# Patient Record
Sex: FEMALE | Race: WHITE | NOT HISPANIC OR LATINO | Employment: UNEMPLOYED | URBAN - NONMETROPOLITAN AREA
[De-identification: names, ages, dates, MRNs, and addresses within clinical notes are randomized per-mention and may not be internally consistent; named-entity substitution may affect disease eponyms.]

---

## 2020-08-25 ENCOUNTER — OFFICE VISIT (OUTPATIENT)
Dept: CARDIOLOGY | Facility: CLINIC | Age: 50
End: 2020-08-25

## 2020-08-25 VITALS
HEART RATE: 88 BPM | WEIGHT: 205 LBS | HEIGHT: 62 IN | DIASTOLIC BLOOD PRESSURE: 86 MMHG | BODY MASS INDEX: 37.73 KG/M2 | TEMPERATURE: 98.2 F | SYSTOLIC BLOOD PRESSURE: 138 MMHG

## 2020-08-25 DIAGNOSIS — G47.30 SLEEP APNEA IN ADULT: ICD-10-CM

## 2020-08-25 DIAGNOSIS — R06.02 SHORTNESS OF BREATH: ICD-10-CM

## 2020-08-25 DIAGNOSIS — E78.1 HYPERTRIGLYCERIDEMIA: ICD-10-CM

## 2020-08-25 DIAGNOSIS — E88.81 METABOLIC SYNDROME: ICD-10-CM

## 2020-08-25 DIAGNOSIS — I10 ESSENTIAL HYPERTENSION: ICD-10-CM

## 2020-08-25 DIAGNOSIS — R00.2 PALPITATIONS: ICD-10-CM

## 2020-08-25 DIAGNOSIS — R42 DIZZINESS: ICD-10-CM

## 2020-08-25 DIAGNOSIS — R07.2 PRECORDIAL PAIN: Primary | ICD-10-CM

## 2020-08-25 PROCEDURE — 93000 ELECTROCARDIOGRAM COMPLETE: CPT | Performed by: INTERNAL MEDICINE

## 2020-08-25 PROCEDURE — 99204 OFFICE O/P NEW MOD 45 MIN: CPT | Performed by: INTERNAL MEDICINE

## 2020-08-25 RX ORDER — DICYCLOMINE HYDROCHLORIDE 10 MG/1
10 CAPSULE ORAL 2 TIMES DAILY
Qty: 60 CAPSULE | Refills: 4 | Status: SHIPPED | OUTPATIENT
Start: 2020-08-25

## 2020-08-25 RX ORDER — HYDROXYCHLOROQUINE SULFATE 200 MG/1
200 TABLET, FILM COATED ORAL 2 TIMES DAILY
COMMUNITY

## 2020-08-25 RX ORDER — DULOXETIN HYDROCHLORIDE 60 MG/1
60 CAPSULE, DELAYED RELEASE ORAL DAILY
COMMUNITY

## 2020-08-25 RX ORDER — CYCLOBENZAPRINE HCL 5 MG
5 TABLET ORAL AS NEEDED
COMMUNITY

## 2020-08-25 RX ORDER — AMLODIPINE BESYLATE 5 MG/1
5 TABLET ORAL DAILY
COMMUNITY

## 2020-08-25 RX ORDER — OMEPRAZOLE 20 MG/1
20 CAPSULE, DELAYED RELEASE ORAL 2 TIMES DAILY
COMMUNITY

## 2020-08-25 RX ORDER — CETIRIZINE HYDROCHLORIDE 10 MG/1
10 TABLET ORAL DAILY
COMMUNITY

## 2020-08-25 RX ORDER — FENOFIBRATE 145 MG/1
145 TABLET, COATED ORAL DAILY
COMMUNITY

## 2020-08-25 NOTE — PATIENT INSTRUCTIONS
Mediterranean Diet  A Mediterranean diet refers to food and lifestyle choices that are based on the traditions of countries located on the [place]. This way of eating has been shown to help prevent certain conditions and improve outcomes for people who have chronic diseases, like kidney disease and heart disease.  What are tips for following this plan?  Lifestyle  · Cook and eat meals together with your family, when possible.  · Drink enough fluid to keep your urine clear or pale yellow.  · Be physically active every day. This includes:  ? Aerobic exercise like running or swimming.  ? Leisure activities like gardening, walking, or housework.  · Get 7-8 hours of sleep each night.  · If recommended by your health care provider, drink red wine in moderation. This means 1 glass a day for nonpregnant women and 2 glasses a day for men. A glass of wine equals 5 oz (150 mL).  Reading food labels    · Check the serving size of packaged foods. For foods such as rice and pasta, the serving size refers to the amount of cooked product, not dry.  · Check the total fat in packaged foods. Avoid foods that have saturated fat or trans fats.  · Check the ingredients list for added sugars, such as corn syrup.  Shopping  · At the grocery store, buy most of your food from the areas near the walls of the store. This includes:  ? Fresh fruits and vegetables (produce).  ? Grains, beans, nuts, and seeds. Some of these may be available in unpackaged forms or large amounts (in bulk).  ? Fresh seafood.  ? Poultry and eggs.  ? Low-fat dairy products.  · Buy whole ingredients instead of prepackaged foods.  · Buy fresh fruits and vegetables in-season from local farmers markets.  · Buy frozen fruits and vegetables in resealable bags.  · If you do not have access to quality fresh seafood, buy precooked frozen shrimp or canned fish, such as tuna, salmon, or sardines.  · Buy small amounts of raw or cooked vegetables, salads, or olives from  the deli or salad bar at your store.  · Stock your pantry so you always have certain foods on hand, such as olive oil, canned tuna, canned tomatoes, rice, pasta, and beans.  Cooking  · Cook foods with extra-virgin olive oil instead of using butter or other vegetable oils.  · Have meat as a side dish, and have vegetables or grains as your main dish. This means having meat in small portions or adding small amounts of meat to foods like pasta or stew.  · Use beans or vegetables instead of meat in common dishes like chili or lasagna.  · [place] with different cooking methods. Try roasting or broiling vegetables instead of steaming or sautéeing them.  · Add frozen vegetables to soups, stews, pasta, or rice.  · Add nuts or seeds for added healthy fat at each meal. You can add these to yogurt, salads, or vegetable dishes.  · Marinate fish or vegetables using olive oil, lemon juice, garlic, and fresh herbs.  Meal planning    · Plan to eat 1 vegetarian meal one day each week. Try to work up to 2 vegetarian meals, if possible.  · Eat seafood 2 or more times a week.  · Have healthy snacks readily available, such as:  ? Vegetable sticks with hummus.  ? Greek yogurt.  ? Fruit and nut trail mix.  · Eat balanced meals throughout the week. This includes:  ? Fruit: 2-3 servings a day  ? Vegetables: 4-5 servings a day  ? Low-fat dairy: 2 servings a day  ? Fish, poultry, or lean meat: 1 serving a day  ? Beans and legumes: 2 or more servings a week  ? Nuts and seeds: 1-2 servings a day  ? Whole grains: 6-8 servings a day  ? Extra-virgin olive oil: 3-4 servings a day  · Limit red meat and sweets to only a few servings a month  What are my food choices?  · Mediterranean diet  ? Recommended  § Grains: Whole-grain pasta. Brown rice. Bulgar wheat. Polenta. Couscous. Whole-wheat bread. Oatmeal. Quinoa.  § Vegetables: Artichokes. Beets. Broccoli. Cabbage. Carrots. Eggplant. Green beans. Chard. Kale. Spinach. Onions. Leeks. Peas. Squash.  Tomatoes. Peppers. Radishes.  § Fruits: Apples. Apricots. Avocado. Berries. Bananas. Cherries. Dates. Figs. Grapes. Norma. Melon. Oranges. Peaches. Plums. Pomegranate.  § Meats and other protein foods: Beans. Almonds. Sunflower seeds. Pine nuts. Peanuts. Cod. [place]. Scallops. Shrimp. Tuna. Tilapia. Clams. Oysters. Eggs.  § Dairy: Low-fat milk. Cheese. Greek yogurt.  § Beverages: Water. Red wine. Herbal tea.  § Fats and oils: Extra virgin olive oil. Avocado oil. Grape seed oil.  § Sweets and desserts: Greek yogurt with honey. Baked apples. Poached pears. Trail mix.  § Seasoning and other foods: Basil. Cilantro. Coriander. Cumin. Mint. Parsley. [place]. Rosemary. Tarragon. Garlic. Oregano. Thyme. Pepper. Balsalmic vinegar. Tahini. Hummus. Tomato sauce. Olives. Mushrooms.  ? Limit these  § Grains: Prepackaged pasta or rice dishes. Prepackaged cereal with added sugar.  § Vegetables: Deep fried potatoes (french fries).  § Fruits: Fruit canned in syrup.  § Meats and other protein foods: Beef. Pork. Lamb. Poultry with skin. Hot dogs. Chaney.  § Dairy: Ice cream. Sour cream. Whole milk.  § Beverages: Juice. Sugar-sweetened soft drinks. Beer. Liquor and spirits.  § Fats and oils: Butter. Canola oil. Vegetable oil. Beef fat (tallow). Lard.  § Sweets and desserts: Cookies. Cakes. Pies. Candy.  § Seasoning and other foods: Mayonnaise. Premade sauces and marinades.  The items listed may not be a complete list. Talk with your dietitian about what dietary choices are right for you.  Summary  · The Mediterranean diet includes both food and lifestyle choices.  · Eat a variety of fresh fruits and vegetables, beans, nuts, seeds, and whole grains.  · Limit the amount of red meat and sweets that you eat.  · Talk with your health care provider about whether it is safe for you to drink red wine in moderation. This means 1 glass a day for nonpregnant women and 2 glasses a day for men. A glass of wine equals 5 oz (150 mL).  This information  is not intended to replace advice given to you by your health care provider. Make sure you discuss any questions you have with your health care provider.  Document Released: 08/10/2017 Document Revised: 08/17/2017 Document Reviewed: 08/10/2017  Elsevier Patient Education © 2020 [employer]

## 2020-08-25 NOTE — PROGRESS NOTES
Chief Complaint   Patient presents with   • Establish Care     Referred for cardiac evaluation. Patient states that she has been having chest pain. States that she thinks that she saw you in the past between 2002 and 2006, states that she wore holter monitor, stress test, and echo. Last lab work was done a couple of days ago per PCP, not in chart, will attempt to obtain. Had EKG done in May before knee surgery.    • Aspirin     Patient is on aspirin.    • Chest Pain     States that chest pain that feels like a stabbing pain for about 4 or 5 months. Had pain in the middle of chest and started vomitting on Sunday and hands were numb and tingly on Sunday. States a few weeks ago she had a pain in her right arm under her bicep. States that fingers stay numb a lot.    • Palpitations     States that she occasionally has palpitations, states that they are not often.    • Dizziness     States that she has dizziness a lot. If she bends over or if she stands up too quickly.    • Sleep Apnea     States that ENT told her that she stopped breathing about 16 times in the night and she was to wear machine at night, but she did not .         CARDIAC COMPLAINTS  chest pressure/discomfort, Dizziness, fatigue and palpitations      Subjective   Suzy Reich is a 50 y.o. female came in today for her initial cardiac evaluation.  She has history of hypertension, hypertriglyceridemia who also has history of rheumatoid arthritis.  She has been having chest pain on and off for the last few months.  Is stabbing pain in the middle part of the chest which is been going on for the last few months.  Recently it is been occurring more frequently and more intensely.  Few days ago she had an episode where she had significant discomfort associated with nausea and vomiting.  During that time she also noted that both her hands became numb and tingling sensation.  She had a similar problem a few weeks ago but at that time she had symptoms mostly  in the right arm.  She also has been having palpitation in the form of heart skipping and racing.  It used to be more often in the past but recently it is getting little better.  She also has episodes of dizziness which occurs more frequently.  The symptoms gets worse when she bends over or if she stands up too suddenly.  She also has been told that she has sleep apnea.  She was seen by ENT doctor in the past who apparently told her that she stopped breathing at least 16 times.  She was advised to wear a CPAP but she has not picked it up.  She has history of smoking which she quit in 97.  She smoked only for 2 years.  She drinks occasional alcohol and she has used marijuana in the past.  She does have family history of hypertension hypercholesterolemia.  Her mother as well as grandmother both had MI at young age.    History reviewed. No pertinent surgical history.    Current Outpatient Medications   Medication Sig Dispense Refill   • amLODIPine (NORVASC) 5 MG tablet Take 5 mg by mouth Daily.     • cetirizine (zyrTEC) 10 MG tablet Take 10 mg by mouth Daily.     • cyclobenzaprine (FLEXERIL) 5 MG tablet Take 5 mg by mouth As Needed for Muscle Spasms.     • DULoxetine (CYMBALTA) 60 MG capsule Take 60 mg by mouth Daily.     • fenofibrate (TRICOR) 145 MG tablet Take 145 mg by mouth Daily.     • hydroxychloroquine (PLAQUENIL) 200 MG tablet Take 200 mg by mouth 2 (Two) Times a Day.     • inFLIXimab-dyyb (INFLECTRA IV) Every 6 weeks     • omeprazole (priLOSEC) 20 MG capsule Take 20 mg by mouth 2 (two) times a day.     • dicyclomine (BENTYL) 10 MG capsule Take 1 capsule by mouth 2 (Two) Times a Day. 60 capsule 4     No current facility-administered medications for this visit.            ALLERGIES:  Vancomycin; Lortab [hydrocodone-acetaminophen]; and Morphine    Past Medical History:   Diagnosis Date   • Anxiety    • CKD (chronic kidney disease) stage 3, GFR 30-59 ml/min (CMS/[facility])    • Depression    • Fibromyalgia    • GERD  (gastroesophageal reflux disease)    • History of  section     X2   • History of cholecystectomy    • History of knee surgery    • History of knee surgery    • History of tubal ligation    • Hyperlipidemia    • Hypertension    • Osteoarthritis    • Rheumatoid arthritis (CMS/HCC)    • Sleep apnea     States that she was told to wear a machine at night, but was never able to .       Social History     Tobacco Use   Smoking Status Former Smoker   • Packs/day: 0.25   • Years: 2.00   • Pack years: 0.50   • Types: Cigarettes   • Last attempt to quit:    • Years since quittin.6   Smokeless Tobacco Never Used          Family History   Problem Relation Age of Onset   • Heart attack Mother    • Heart disease Mother    • Hypertension Mother    • Hyperlipidemia Mother    • Hypertension Father    • Hyperlipidemia Father    • Hypertension Sister    • Hyperlipidemia Sister    • Heart attack Maternal Grandmother    • Heart disease Maternal Grandmother        Review of Systems   Constitution: Positive for malaise/fatigue. Negative for decreased appetite.   HENT: Negative for congestion and sore throat.    Eyes: Negative for blurred vision.   Cardiovascular: Positive for chest pain and palpitations.   Respiratory: Positive for snoring. Negative for shortness of breath.    Endocrine: Negative for cold intolerance and heat intolerance.   Hematologic/Lymphatic: Negative for adenopathy. Does not bruise/bleed easily.   Skin: Negative for itching, nail changes and skin cancer.   Musculoskeletal: Negative for arthritis and myalgias.   Gastrointestinal: Negative for abdominal pain, dysphagia and heartburn.   Genitourinary: Negative for bladder incontinence and frequency.   Neurological: Positive for dizziness and light-headedness. Negative for seizures and vertigo.   Psychiatric/Behavioral: Negative for altered mental status.   Allergic/Immunologic: Negative for environmental allergies and hives.       Diabetes-  "No  Thyroid- normal    Objective     /86 (BP Location: Right arm)   Pulse 88   Temp 98.2 °F (36.8 °C)   Ht 157.5 cm (62\")   Wt 93 kg (205 lb)   BMI 37.49 kg/m²     Physical Exam   Constitutional: She is oriented to person, place, and time. She appears well-developed and well-nourished.   HENT:   Head: Normocephalic.   Nose: Nose normal.   Eyes: Pupils are equal, round, and reactive to light. EOM are normal.   Neck: Normal range of motion. Neck supple.   Cardiovascular: Normal rate, regular rhythm, S1 normal and S2 normal.   Murmur heard.  Pulmonary/Chest: Effort normal and breath sounds normal.   Abdominal: Soft. Bowel sounds are normal.   Musculoskeletal: Normal range of motion. She exhibits no edema.   Neurological: She is alert and oriented to person, place, and time.   Skin: Skin is warm and dry.   Psychiatric: She has a normal mood and affect.         ECG 12 Lead  Date/Time: 8/25/2020 4:25 PM  Performed by: Carlee Valdivia MD  Authorized by: Carlee Valdivia MD   Previous ECG: no previous ECG available  Rhythm: sinus rhythm  Rate: normal  Q waves: V3 and V4    QRS axis: normal    Clinical impression: abnormal EKG              Assessment/Plan   Patient's Body mass index is 37.49 kg/m². BMI is above normal parameters. Recommendations include: educational material, exercise counseling and nutrition counseling.     Suzy was seen today for establish care, aspirin, chest pain, palpitations, dizziness and sleep apnea.    Diagnoses and all orders for this visit:    Precordial pain  -     Stress Test With Myocardial Perfusion One Day; Future  -     dicyclomine (BENTYL) 10 MG capsule; Take 1 capsule by mouth 2 (Two) Times a Day.    Essential hypertension  -     Adult Transthoracic Echo Complete W/ Cont if Necessary Per Protocol; Future    Palpitations  -     Stress Test With Myocardial Perfusion One Day; Future    Dizziness    Sleep apnea in adult  -     Overnight Sleep Oximetry Study; " Future    Shortness of breath  -     Adult Transthoracic Echo Complete W/ Cont if Necessary Per Protocol; Future    Metabolic syndrome  -     Overnight Sleep Oximetry Study; Future    Hypertriglyceridemia       At baseline her heart rate is within normal limit.  Her blood pressure is upper limit of normal.  Her EKG shows sinus rhythm with Q waves in the anterior leads.  Her clinical examination reveals a BMI of 38.  She does have short systolic murmur at the mitral area.  She does have some mild epigastric tenderness.  She has normal peripheral pulse and no pedal edema.    Regarding the chest pain, it appears to be more GI than cardiac but she does have multiple risk factor for CAD.  I started her on Bentyl 10 mg twice a day to be taken along with the Prilosec she has been taking.  I also schedule her to undergo a stress test to evaluate her functional status, chronotropic response, blood pressure response and to rule out any stress-induced ischemia causing the problem.    Regarding the shortness of breath, she is advised to undergo an echocardiogram to evaluate the LV function valvular structures and PA pressure and to rule out any pericardial effusion.    Regarding her hypertension, it appears to be stable with Norvasc so we will continue the same.  If she has significant stress-induced hypertension then will add ACE inhibitor.  If there is significant increased chronotropic response then she might benefit with a low-dose of beta-blockers which can also help with the palpitation.    Regarding the sleep apnea, I like to recheck her nocturnal pulse PO2 measurement.  If there is significant hypoxia, she may need to undergo sleep study.    Regarding her hypertriglyceridemia, she is on Tricor.  I will really appreciate, if you can send me copy of her labs    Regarding her metabolic syndrome, I talked to her about diet and weight reduction.  I talked to her about different diets and gave her papers on Mediterranean  diet    Based on the results, further recommendations will be made.             Electronically signed by Carlee Valdivia MD August 25, 2020 15:30

## 2020-09-03 ENCOUNTER — HOSPITAL ENCOUNTER (OUTPATIENT)
Dept: CARDIOLOGY | Facility: HOSPITAL | Age: 50
Discharge: HOME | End: 2020-09-03

## 2020-09-03 DIAGNOSIS — R07.2 PRECORDIAL PAIN: ICD-10-CM

## 2020-09-03 DIAGNOSIS — R06.02 SHORTNESS OF BREATH: ICD-10-CM

## 2020-09-03 DIAGNOSIS — R00.2 PALPITATIONS: ICD-10-CM

## 2020-09-03 DIAGNOSIS — I10 ESSENTIAL HYPERTENSION: ICD-10-CM

## 2020-09-03 PROCEDURE — 93017 CV STRESS TEST TRACING ONLY: CPT

## 2020-09-03 PROCEDURE — 0 TECHNETIUM SESTAMIBI: Performed by: INTERNAL MEDICINE

## 2020-09-03 PROCEDURE — 78452 HT MUSCLE IMAGE SPECT MULT: CPT | Performed by: INTERNAL MEDICINE

## 2020-09-03 PROCEDURE — 93018 CV STRESS TEST I&R ONLY: CPT | Performed by: INTERNAL MEDICINE

## 2020-09-03 PROCEDURE — 93306 TTE W/DOPPLER COMPLETE: CPT

## 2020-09-03 PROCEDURE — 93306 TTE W/DOPPLER COMPLETE: CPT | Performed by: INTERNAL MEDICINE

## 2020-09-03 PROCEDURE — A9500 TC99M SESTAMIBI: HCPCS | Performed by: INTERNAL MEDICINE

## 2020-09-03 PROCEDURE — 25010000002 REGADENOSON 0.4 MG/5ML SOLUTION: Performed by: INTERNAL MEDICINE

## 2020-09-03 PROCEDURE — 78452 HT MUSCLE IMAGE SPECT MULT: CPT

## 2020-09-03 PROCEDURE — 93016 CV STRESS TEST SUPVJ ONLY: CPT | Performed by: INTERNAL MEDICINE

## 2020-09-03 RX ADMIN — REGADENOSON 0.4 MG: 0.08 INJECTION, SOLUTION INTRAVENOUS at 11:30

## 2020-09-03 RX ADMIN — TECHNETIUM TC 99M SESTAMIBI 1 DOSE: 1 INJECTION INTRAVENOUS at 09:56

## 2020-09-03 RX ADMIN — TECHNETIUM TC 99M SESTAMIBI 1 DOSE: 1 INJECTION INTRAVENOUS at 11:30

## 2020-09-04 LAB
BH CV ECHO MEAS - ACS: 2.2 CM
BH CV ECHO MEAS - AO MAX PG: 10.8 MMHG
BH CV ECHO MEAS - AO MEAN PG: 7 MMHG
BH CV ECHO MEAS - AO ROOT AREA (BSA CORRECTED): 1.8
BH CV ECHO MEAS - AO ROOT AREA: 9.3 CM^2
BH CV ECHO MEAS - AO ROOT DIAM: 3.5 CM
BH CV ECHO MEAS - AO V2 MAX: 164 CM/SEC
BH CV ECHO MEAS - AO V2 MEAN: 122 CM/SEC
BH CV ECHO MEAS - AO V2 VTI: 38.5 CM
BH CV ECHO MEAS - BSA(HAYCOCK): 2.1 M^2
BH CV ECHO MEAS - BSA: 1.9 M^2
BH CV ECHO MEAS - BZI_BMI: 37.5 KILOGRAMS/M^2
BH CV ECHO MEAS - BZI_METRIC_HEIGHT: 157.5 CM
BH CV ECHO MEAS - BZI_METRIC_WEIGHT: 93 KG
BH CV ECHO MEAS - EDV(CUBED): 58.4 ML
BH CV ECHO MEAS - EDV(MOD-SP4): 103 ML
BH CV ECHO MEAS - EDV(TEICH): 65.1 ML
BH CV ECHO MEAS - EF(CUBED): 66.3 %
BH CV ECHO MEAS - EF(MOD-SP4): 40.9 %
BH CV ECHO MEAS - EF(TEICH): 58.5 %
BH CV ECHO MEAS - ESV(CUBED): 19.7 ML
BH CV ECHO MEAS - ESV(MOD-SP4): 60.9 ML
BH CV ECHO MEAS - ESV(TEICH): 27 ML
BH CV ECHO MEAS - FS: 30.4 %
BH CV ECHO MEAS - IVS/LVPW: 1.1
BH CV ECHO MEAS - IVSD: 1.6 CM
BH CV ECHO MEAS - LA DIMENSION: 4.9 CM
BH CV ECHO MEAS - LA/AO: 1.4
BH CV ECHO MEAS - LV DIASTOLIC VOL/BSA (35-75): 53.3 ML/M^2
BH CV ECHO MEAS - LV IVRT: 0.09 SEC
BH CV ECHO MEAS - LV MASS(C)D: 227.7 GRAMS
BH CV ECHO MEAS - LV MASS(C)DI: 117.9 GRAMS/M^2
BH CV ECHO MEAS - LV SYSTOLIC VOL/BSA (12-30): 31.5 ML/M^2
BH CV ECHO MEAS - LVIDD: 3.9 CM
BH CV ECHO MEAS - LVIDS: 2.7 CM
BH CV ECHO MEAS - LVLD AP4: 7.8 CM
BH CV ECHO MEAS - LVLS AP4: 6.6 CM
BH CV ECHO MEAS - LVOT AREA (M): 3.8 CM^2
BH CV ECHO MEAS - LVOT AREA: 3.8 CM^2
BH CV ECHO MEAS - LVOT DIAM: 2.2 CM
BH CV ECHO MEAS - LVPWD: 1.4 CM
BH CV ECHO MEAS - MV A MAX VEL: 116 CM/SEC
BH CV ECHO MEAS - MV DEC SLOPE: 506 CM/SEC^2
BH CV ECHO MEAS - MV E MAX VEL: 106 CM/SEC
BH CV ECHO MEAS - MV E/A: 0.91
BH CV ECHO MEAS - RAP SYSTOLE: 10 MMHG
BH CV ECHO MEAS - RVDD: 3 CM
BH CV ECHO MEAS - RVSP: 35.4 MMHG
BH CV ECHO MEAS - SI(AO): 186.3 ML/M^2
BH CV ECHO MEAS - SI(CUBED): 20 ML/M^2
BH CV ECHO MEAS - SI(MOD-SP4): 21.8 ML/M^2
BH CV ECHO MEAS - SI(TEICH): 19.7 ML/M^2
BH CV ECHO MEAS - SV(AO): 359.9 ML
BH CV ECHO MEAS - SV(CUBED): 38.7 ML
BH CV ECHO MEAS - SV(MOD-SP4): 42.1 ML
BH CV ECHO MEAS - SV(TEICH): 38.1 ML
BH CV ECHO MEAS - TR MAX VEL: 252 CM/SEC
BH CV NUCLEAR PRIOR STUDY: 2
BH CV STRESS COMMENTS STAGE 1: NORMAL
BH CV STRESS DOSE REGADENOSON STAGE 1: 0.4
BH CV STRESS DURATION MIN STAGE 1: 0
BH CV STRESS DURATION SEC STAGE 1: 10
BH CV STRESS PROTOCOL 1: NORMAL
BH CV STRESS RECOVERY BP: NORMAL MMHG
BH CV STRESS RECOVERY HR: 98 BPM
BH CV STRESS STAGE 1: 1
LV EF NUC BP: 62 %
MAXIMAL PREDICTED HEART RATE: 171 BPM
MAXIMAL PREDICTED HEART RATE: 171 BPM
PERCENT MAX PREDICTED HR: 69.59 %
STRESS BASELINE BP: NORMAL MMHG
STRESS BASELINE HR: 75 BPM
STRESS PERCENT HR: 82 %
STRESS POST PEAK BP: NORMAL MMHG
STRESS POST PEAK HR: 119 BPM
STRESS TARGET HR: 145 BPM
STRESS TARGET HR: 145 BPM

## 2020-09-04 RX ORDER — ISOSORBIDE MONONITRATE 30 MG/1
30 TABLET, EXTENDED RELEASE ORAL DAILY
Qty: 30 TABLET | Refills: 8 | Status: SHIPPED | OUTPATIENT
Start: 2020-09-04 | End: 2021-05-13

## 2021-01-13 DIAGNOSIS — R07.2 PRECORDIAL PAIN: ICD-10-CM

## 2021-01-15 RX ORDER — DICYCLOMINE HYDROCHLORIDE 10 MG/1
10 CAPSULE ORAL 2 TIMES DAILY
Qty: 60 CAPSULE | Refills: 4 | OUTPATIENT
Start: 2021-01-15

## 2021-04-13 RX ORDER — ISOSORBIDE MONONITRATE 30 MG/1
TABLET, EXTENDED RELEASE ORAL
Qty: 30 TABLET | Refills: 8 | OUTPATIENT
Start: 2021-04-13

## 2021-05-13 DIAGNOSIS — R07.2 PRECORDIAL PAIN: Primary | ICD-10-CM

## 2021-05-13 RX ORDER — ISOSORBIDE MONONITRATE 30 MG/1
TABLET, EXTENDED RELEASE ORAL
Qty: 30 TABLET | Refills: 2 | Status: SHIPPED | OUTPATIENT
Start: 2021-05-13